# Patient Record
Sex: FEMALE | Race: WHITE | NOT HISPANIC OR LATINO | Employment: FULL TIME | URBAN - METROPOLITAN AREA
[De-identification: names, ages, dates, MRNs, and addresses within clinical notes are randomized per-mention and may not be internally consistent; named-entity substitution may affect disease eponyms.]

---

## 2023-10-25 ENCOUNTER — OFFICE VISIT (OUTPATIENT)
Dept: URGENT CARE | Facility: URGENT CARE | Age: 38
End: 2023-10-25
Payer: COMMERCIAL

## 2023-10-25 VITALS
DIASTOLIC BLOOD PRESSURE: 87 MMHG | WEIGHT: 210.7 LBS | OXYGEN SATURATION: 97 % | RESPIRATION RATE: 20 BRPM | SYSTOLIC BLOOD PRESSURE: 132 MMHG | HEART RATE: 94 BPM | TEMPERATURE: 97.4 F

## 2023-10-25 DIAGNOSIS — R09.89 RESPIRATORY SYMPTOMS: Primary | ICD-10-CM

## 2023-10-25 PROCEDURE — 99203 OFFICE O/P NEW LOW 30 MIN: CPT | Performed by: INTERNAL MEDICINE

## 2023-10-25 ASSESSMENT — ENCOUNTER SYMPTOMS
DIARRHEA: 0
RHINORRHEA: 0
CHILLS: 0
DIAPHORESIS: 0
FEVER: 0
MYALGIAS: 0
HEADACHES: 0
SORE THROAT: 1

## 2023-10-25 NOTE — PATIENT INSTRUCTIONS
Lung exam normal, oxygen level normal     Observe for symptoms     Call or return to clinic if symptoms worsen or fail to improve as anticipated.

## 2023-10-25 NOTE — PROGRESS NOTES
ASSESSMENT AND PLAN:      ICD-10-CM    1. Respiratory symptoms  R09.89       Offered chest x-ray   Pt  felt if lung exam was fine, would observe  Patient Instructions   Lung exam normal, oxygen level normal     Observe for symptoms     Call or return to clinic if symptoms worsen or fail to improve as anticipated.    No follow-ups on file.        Paty Cain MD  [facility]    Subjective     Celia Hogan is a 38 year old who presents for Patient presents with:  Wheezing: With some crackle since Sunday on the right side when laying down- now more so when she is sitting up    [facility]    Adult    Onset of symptoms was 4 day(s) ago.  Course of illness is worsening.      Current and Associated symptoms:  Noticing noise while sleeping on right side   Now with lying flat, hears wheeze & crunches  Cough for 1-2 days    Treatment measures tried include hot shower.  Predisposing factors include ill contact: none known.  Pneumonia 10-15 years ago    Pilates - exercising without problems  Home covid negative     Patient Active Problem List   Diagnosis    Major depression    Insomnia    PCOS (polycystic ovarian syndrome)    CARDIOVASCULAR SCREENING; LDL GOAL LESS THAN 160         Review of Systems   Constitutional:  Negative for chills, diaphoresis and fever.   HENT:  Positive for sore throat (from cough). Negative for congestion, postnasal drip and rhinorrhea.    Gastrointestinal:  Negative for diarrhea.   Musculoskeletal:  Negative for myalgias.   Neurological:  Negative for headaches.           Objective    /87 (BP Location: Right arm, Patient Position: Sitting, Cuff Size: Adult Large)   Pulse 94   Temp 97.4  F (36.3  C) (Temporal)   Resp 20   Wt 95.6 kg (210 lb 11.2 oz)   SpO2 97%   Physical Exam  Vitals reviewed.   Constitutional:       Appearance: Normal appearance. She is not ill-appearing.   HENT:      Right Ear: Tympanic membrane normal.      Left Ear:  Tympanic membrane normal.      Nose: Nose normal.      Mouth/Throat:      Mouth: Mucous membranes are moist.      Pharynx: Oropharynx is clear.   Cardiovascular:      Rate and Rhythm: Normal rate and regular rhythm.      Pulses: Normal pulses.      Heart sounds: Normal heart sounds.   Pulmonary:      Effort: Pulmonary effort is normal. No respiratory distress.      Breath sounds: Normal breath sounds. No stridor. No wheezing, rhonchi or rales.   Neurological:      Mental Status: She is alert.